# Patient Record
Sex: FEMALE | Race: OTHER | HISPANIC OR LATINO | Employment: FULL TIME | URBAN - METROPOLITAN AREA
[De-identification: names, ages, dates, MRNs, and addresses within clinical notes are randomized per-mention and may not be internally consistent; named-entity substitution may affect disease eponyms.]

---

## 2018-01-05 ENCOUNTER — HOSPITAL ENCOUNTER (EMERGENCY)
Facility: HOSPITAL | Age: 23
Discharge: HOME | End: 2018-01-05
Attending: EMERGENCY MEDICINE | Admitting: EMERGENCY MEDICINE
Payer: MEDICAID

## 2018-01-05 VITALS
OXYGEN SATURATION: 100 % | WEIGHT: 186 LBS | BODY MASS INDEX: 37.5 KG/M2 | TEMPERATURE: 98.2 F | RESPIRATION RATE: 18 BRPM | HEIGHT: 59 IN | SYSTOLIC BLOOD PRESSURE: 131 MMHG | DIASTOLIC BLOOD PRESSURE: 81 MMHG | HEART RATE: 98 BPM
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
## 2018-01-05 DIAGNOSIS — N61.0 CELLULITIS OF LEFT BREAST: Primary | ICD-10-CM

## 2018-01-05 PROCEDURE — 99282 EMERGENCY DEPT VISIT SF MDM: CPT

## 2018-01-05 RX ORDER — AMOXICILLIN AND CLAVULANATE POTASSIUM 875; 125 MG/1; MG/1
1 TABLET, FILM COATED ORAL ONCE
Status: COMPLETED | OUTPATIENT
Start: 2018-01-05 | End: 2018-01-05

## 2018-01-05 RX ORDER — AMOXICILLIN AND CLAVULANATE POTASSIUM 875; 125 MG/1; MG/1
1 TABLET, FILM COATED ORAL EVERY 12 HOURS
Qty: 19 TABLET | Refills: 0 | Status: SHIPPED | OUTPATIENT
Start: 2018-01-05 | End: 2018-01-15

## 2018-01-05 RX ADMIN — AMOXICILLIN AND CLAVULANATE POTASSIUM 1 TABLET: 875; 125 TABLET, FILM COATED ORAL at 16:26
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
## 2018-02-09 ENCOUNTER — HOSPITAL ENCOUNTER (EMERGENCY)
Facility: HOSPITAL | Age: 23
Discharge: HOME | End: 2018-02-09
Attending: EMERGENCY MEDICINE
Payer: MEDICAID

## 2018-02-09 VITALS
WEIGHT: 196 LBS | DIASTOLIC BLOOD PRESSURE: 57 MMHG | HEIGHT: 59 IN | BODY MASS INDEX: 39.51 KG/M2 | SYSTOLIC BLOOD PRESSURE: 111 MMHG | TEMPERATURE: 97.8 F | OXYGEN SATURATION: 100 % | HEART RATE: 92 BPM | RESPIRATION RATE: 18 BRPM

## 2018-02-09 DIAGNOSIS — J11.1 INFLUENZA: Primary | ICD-10-CM

## 2018-02-09 LAB
BILIRUB UR QL STRIP: NEGATIVE
CLARITY UR: CLEAR
COLOR UR: YELLOW
EXT PREG TEST URINE: NEGATIVE
GLUCOSE UR STRIP-MCNC: NEGATIVE MG/DL
HGB UR QL STRIP.AUTO: NEGATIVE
KETONES UR STRIP-MCNC: ABNORMAL MG/DL
LEUKOCYTE ESTERASE UR QL STRIP: NEGATIVE
NITRITE UR QL STRIP: NEGATIVE
PH UR STRIP.AUTO: 6.5 [PH] (ref 4.5–8)
PROT UR STRIP-MCNC: NEGATIVE MG/DL
SP GR UR STRIP.AUTO: 1.02 (ref 1–1.03)
UROBILINOGEN UR QL STRIP.AUTO: 0.2 E.U./DL

## 2018-02-09 PROCEDURE — 99284 EMERGENCY DEPT VISIT MOD MDM: CPT

## 2018-02-09 PROCEDURE — 81003 URINALYSIS AUTO W/O SCOPE: CPT

## 2018-02-09 PROCEDURE — 81025 URINE PREGNANCY TEST: CPT | Performed by: EMERGENCY MEDICINE

## 2018-02-09 RX ORDER — ONDANSETRON 4 MG/1
4 TABLET, FILM COATED ORAL EVERY 6 HOURS
Qty: 12 TABLET | Refills: 0 | Status: SHIPPED | OUTPATIENT
Start: 2018-02-09 | End: 2018-03-07

## 2018-02-09 RX ORDER — ONDANSETRON 4 MG/1
4 TABLET, ORALLY DISINTEGRATING ORAL ONCE
Status: COMPLETED | OUTPATIENT
Start: 2018-02-09 | End: 2018-02-09

## 2018-02-09 RX ORDER — MAGNESIUM HYDROXIDE/ALUMINUM HYDROXICE/SIMETHICONE 120; 1200; 1200 MG/30ML; MG/30ML; MG/30ML
30 SUSPENSION ORAL EVERY 4 HOURS PRN
Status: DISCONTINUED | OUTPATIENT
Start: 2018-02-09 | End: 2018-02-09 | Stop reason: HOSPADM

## 2018-02-09 RX ADMIN — ONDANSETRON 4 MG: 4 TABLET, ORALLY DISINTEGRATING ORAL at 14:54

## 2018-02-09 RX ADMIN — ALUMINUM HYDROXIDE, MAGNESIUM HYDROXIDE, AND SIMETHICONE 30 ML: 200; 200; 20 SUSPENSION ORAL at 15:17

## 2018-02-09 NOTE — DISCHARGE INSTRUCTIONS
Influenza, Ambulatory Care   GENERAL INFORMATION:   Influenza (the flu) is an infection caused by the influenza virus  The flu is easily spread when an infected person coughs, sneezes, or has close contact with others  You may be able to spread the flu to others for 1 week or longer after signs or symptoms appear  Common symptoms include the following:   · Fever and chills    · Headaches, body aches, and muscle or joint pain    · Cough, runny nose, and sore throat    · Loss of appetite, nausea, vomiting, or diarrhea    · Tiredness    · Trouble breathing  Seek immediate care for the following symptoms:   · Dizziness    · Urinating less or not at all    · A seizure    · A headache, stiff neck, and confusion    · Trouble breathing with blue or purple lips    · New pain or pressure in your chest  Treatment for influenza  may include any of the following:  · Acetaminophen  decreases pain and fever  It is available without a doctor's order  Ask your healthcare provider how much to take and how often to take it  Follow directions  Acetaminophen can cause liver damage if not taken correctly  · NSAIDs  help decrease swelling and pain or fever  This medicine is available with or without a doctor's order  NSAIDs can cause stomach bleeding or kidney problems in certain people  If you take blood thinner medicine, always ask your healthcare provider if NSAIDs are safe for you  Always read the medicine label and follow directions  · Antivirals  are given to fight an infection caused by a virus  Manage your symptoms:   · Get more rest and sleep  to help you get better faster when you have the flu  · Drink more liquids as directed  Ask your healthcare provider how much liquid to drink each day and which liquids are best for you  Drinking liquids helps prevent dehydration  Prevent the spread of influenza:   · Wash your hands often  Use soap and water  Use gel hand cleanser when there is no soap and water available   Do not touch your eyes, nose, or mouth unless you have washed your hands first            · Cover your mouth and nose with a tissue when you sneeze or cough  Throw the tissue in the trash right away  · Clean shared items  Clean table surfaces, doorknobs, and light switches with a germ-killing   Do not share towels, silverware, or dishes with people who are sick  Wash bed sheets, towels, silverware, and dishes with soap and water  · Wear a face mask  over your mouth and nose if you have the flu or are near anyone who has the flu  Ask where to buy single-use masks  · Stay home if you are sick  Stay away from others as much as possible while you recover  · Get an influenza vaccine  to help prevent the flu  Everyone older than age 7 months should get a yearly influenza vaccine  Get the vaccine as soon as it is available, usually in October or November each year  Follow up with your healthcare provider as directed:  Write down your questions so you remember to ask them during your visits  CARE AGREEMENT:   You have the right to help plan your care  Learn about your health condition and how it may be treated  Discuss treatment options with your caregivers to decide what care you want to receive  You always have the right to refuse treatment  The above information is an  only  It is not intended as medical advice for individual conditions or treatments  Talk to your doctor, nurse or pharmacist before following any medical regimen to see if it is safe and effective for you  © 2014 [address] is for End User's use only and may not be sold, redistributed or otherwise used for commercial purposes  All illustrations and images included in CareNotes® are the copyrighted property of [employer]  or Manoj Johnson

## 2018-02-09 NOTE — ED PROVIDER NOTES
History  Chief Complaint   Patient presents with    Abdominal Pain     Pt c/o URI symptoms,vomiting, abdominal pain and left ear pain for approx  3 days     This is a 71-year-old female with a past medical history of asthma who presents to the emergency room this afternoon with three days of subjective fevers, cough, nausea/vomiting, and epigastric pain  Patient states that she has been tolerating her symptoms until today and decided to come into the ER  Patient has been taking ibuprofen and Tylenol for the symptoms with little relief  Patient states that her abdominal pain is in epigastric region and radiates to her back  Patient states that she has been vomiting multiple times per day without any hematemesis  Patient last vomited this morning, three times prior to coming here  Further review of systems is as below  None       Past Medical History:   Diagnosis Date    Asthma        History reviewed  No pertinent surgical history  History reviewed  No pertinent family history  I have reviewed and agree with the history as documented  Social History   Substance Use Topics    Smoking status: Never Smoker    Smokeless tobacco: Never Used    Alcohol use No        Review of Systems   Constitutional: Positive for fever (Subjective)  Negative for chills and fatigue  HENT: Negative for congestion, rhinorrhea, sinus pressure and sore throat  Eyes: Negative for visual disturbance  Respiratory: Positive for cough  Negative for shortness of breath and wheezing  Cardiovascular: Negative for chest pain and leg swelling  Gastrointestinal: Positive for abdominal pain ( epigastric)  Negative for constipation, diarrhea, nausea and vomiting  Genitourinary: Negative for dysuria, frequency, hematuria and urgency  Musculoskeletal: Negative for arthralgias and myalgias  Skin: Negative for color change and rash  Neurological: Negative for dizziness, light-headedness and numbness         Physical Exam  ED Triage Vitals   Temperature Pulse Respirations Blood Pressure SpO2   02/09/18 1022 02/09/18 1022 02/09/18 1022 02/09/18 1022 02/09/18 1022   97 8 °F (36 6 °C) 80 18 123/84 100 %      Temp Source Heart Rate Source Patient Position - Orthostatic VS BP Location FiO2 (%)   02/09/18 1022 02/09/18 1113 02/09/18 1113 02/09/18 1113 --   Oral Monitor Lying Right arm       Pain Score       02/09/18 1022       8           Orthostatic Vital Signs  Vitals:    02/09/18 1203 02/09/18 1308 02/09/18 1416 02/09/18 1503   BP: 117/51 104/54 111/57 111/57   Pulse: 87 86 86 92   Patient Position - Orthostatic VS: Lying Lying Lying Lying       Physical Exam   Constitutional: She is oriented to person, place, and time  She appears well-developed and well-nourished  No distress  HENT:   Head: Normocephalic and atraumatic  Eyes: Conjunctivae are normal  Pupils are equal, round, and reactive to light  Cardiovascular: Normal rate, regular rhythm and normal heart sounds  Exam reveals no gallop and no friction rub  No murmur heard  Pulmonary/Chest: Effort normal and breath sounds normal  No respiratory distress  She has no wheezes  She has no rales  Abdominal: Soft  Bowel sounds are normal  She exhibits no distension  There is tenderness (Epigastric radiating to back)  There is no guarding  Musculoskeletal: Normal range of motion  She exhibits no edema, tenderness or deformity  Neurological: She is alert and oriented to person, place, and time  Skin: Skin is warm and dry  Capillary refill takes less than 2 seconds  She is not diaphoretic  Psychiatric: She has a normal mood and affect  Her behavior is normal    Nursing note and vitals reviewed        ED Medications  Medications   aluminum-magnesium hydroxide-simethicone (MYLANTA) 200-200-20 mg/5 mL oral suspension 30 mL (30 mL Oral Given 2/9/18 1517)   ondansetron (ZOFRAN-ODT) dispersible tablet 4 mg (4 mg Oral Given 2/9/18 2884)       Diagnostic Studies  Results Reviewed     Procedure Component Value Units Date/Time    POCT pregnancy, urine [[id number]]  (Normal) Resulted:  02/09/18 1118    Lab Status:  Final result Updated:  02/09/18 1219     EXT PREG TEST UR (Ref: Negative) NEGATIVE    ED Urine Macroscopic [[id number]]  (Abnormal) Collected:  02/09/18 1122    Lab Status:  Final result Specimen:  Urine Updated:  02/09/18 1117     Color, UA Yellow     Clarity, UA Clear     pH, UA 6 5     Leukocytes, UA Negative     Nitrite, UA Negative     Protein, UA Negative mg/dl      Glucose, UA Negative mg/dl      Ketones, UA Trace (A) mg/dl      Urobilinogen, UA 0 2 E U /dl      Bilirubin, UA Negative     Blood, UA Negative     Specific Gravity, UA 1 025    Narrative:       CLINITEK RESULT                 No orders to display         Procedures  Procedures      Phone Consults  ED Phone Contact    ED Course  ED Course                                MDM  Number of Diagnoses or Management Options  Influenza:   Diagnosis management comments: Patient likely suffering from influenza since symptoms have been going on for 72 hours there is no place for Tamiflu at this time  Patient was given Zofran and a GI cocktail here in the emergency room with improvement in her symptoms  She was discharged home in good condition with prescription for Zofran and instructions to return to the ER should symptoms worsen  CritCare Time    Disposition  Final diagnoses:   Influenza     Time reflects when diagnosis was documented in both MDM as applicable and the Disposition within this note     Time User Action Codes Description Comment    2/9/2018  3:22 PM Dinora Mcallister Add [J11 1] Influenza       ED Disposition     ED Disposition Condition Comment    Discharge  [address] discharge to home/self care      Condition at discharge: Good        Follow-up Information     Follow up With Specialties Details Why Contact Info    [employer]    [phone]          Discharge Medication List as of 2/9/2018  3:24 PM START taking these medications    Details   ondansetron (ZOFRAN) 4 mg tablet Take 1 tablet (4 mg total) by mouth every 6 (six) hours, Starting Fri 2/9/2018, Print           No discharge procedures on file  ED Provider  Attending physically available and evaluated [facility]  I managed the patient along with the ED Attending      Electronically Signed by         Nannette Romberg, MD  02/09/18 8526

## 2018-02-09 NOTE — ED ATTENDING ATTESTATION
Salas Warren MD, saw and evaluated the patient  I have discussed the patient with the resident/non-physician practitioner and agree with the resident's/non-physician practitioner's findings, Plan of Care, and MDM as documented in the resident's/non-physician practitioner's note, except where noted  All available labs and Radiology studies were reviewed  At this point I agree with the current assessment done in the Emergency Department  I have conducted an independent evaluation of this patient including a focused history and a physical exam     80-year-old female, previously healthy, presenting to the emergency department for evaluation of a several day history of cough, low-grade intermittent fevers, and 1 day of vomiting  Patient was sent home from work for the same  Patient reports some epigastric abdominal discomfort that she describes as a burning type sensation  No diarrhea  No chest pain  Cough is nonproductive  Ten systems reviewed negative except as noted in the history of present illness  The patient is resting comfortably on a stretcher in no acute respiratory distress  The patient appears nontoxic  HEENT reveals moist mucous membranes  Head is normocephalic and atraumatic  Conjunctiva and sclera are normal  Neck is nontender and supple with full range of motion to flexion, extension, lateral rotation  No meningismus appreciated  No masses are appreciated  Lungs are clear to auscultation bilaterally without any wheezes, rales or rhonchi  Heart is regular rate and rhythm without any murmurs, rubs or gallops  Abdomen is soft and nontender without any rebound or guarding  Extremities appear grossly normal without any significant arthropathy  Patient is awake, alert, and oriented x3  The patient has normal interaction  Motor is 5 out of 5  Assessment and plan:  80-year-old female presenting to the emergency department for evaluation several days of cough, fever, 1 day of vomiting  Patient appears clinically well  Likely influenza  Plan is Zofran and GI cocktail

## 2018-03-07 ENCOUNTER — HOSPITAL ENCOUNTER (EMERGENCY)
Facility: HOSPITAL | Age: 23
Discharge: HOME | End: 2018-03-07
Attending: EMERGENCY MEDICINE | Admitting: EMERGENCY MEDICINE
Payer: MEDICAID

## 2018-03-07 VITALS
SYSTOLIC BLOOD PRESSURE: 126 MMHG | OXYGEN SATURATION: 100 % | WEIGHT: 188 LBS | BODY MASS INDEX: 37.97 KG/M2 | TEMPERATURE: 99.8 F | HEART RATE: 99 BPM | RESPIRATION RATE: 18 BRPM | DIASTOLIC BLOOD PRESSURE: 58 MMHG

## 2018-03-07 DIAGNOSIS — Z98.890 S/P BILATERAL BREAST REDUCTION: ICD-10-CM

## 2018-03-07 DIAGNOSIS — N64.59 BLEEDING FROM BREAST: Primary | ICD-10-CM

## 2018-03-07 PROCEDURE — 99283 EMERGENCY DEPT VISIT LOW MDM: CPT

## 2018-03-07 NOTE — ED ATTENDING ATTESTATION
Emeka Castorena MD, saw and evaluated the patient  All available labs and X-rays were ordered by me or the resident and have been reviewed by myself  I discussed the patient with the resident / non-physician and agree with the resident's / non-physician practitioner's findings and plan as documented in the resident's / non-physician practicitioner's note, except where noted  At this point, I agree with the current assessment done in the ED  Chief Complaint   Patient presents with    Post-op Problem     pt had breast reduction Monday and spotting was noted on bandages today  This is a 77-year-old female presenting for evaluation of postoperative bleeding  The patient states that she had a breast reduction surgery done in [place] on Monday  Today she noted a very small amount of bleeding from her right lower breast incision  She was concerned about the came in for evaluation  Denies fevers chills nausea vomiting chest pain shortness of breath  Denies dizziness or lightheadedness  She did not call the surgeon but instead came here  Denies any blood thinners  PMH:  - None  PSH:  - surgical reduction  Denies smoking, drinking, drugs  PE:  Vitals:    03/07/18 1625 03/07/18 1655   BP: 126/58    BP Location: Left arm    Pulse: (!) 116 99   Resp: 20 18   Temp: 99 8 °F (37 7 °C)    TempSrc: Oral    SpO2: 99% 100%   Weight: 85 3 kg (188 lb)    General: VS reviewed  Appears in NAD  awake, alert  Well-nourished, well-developed  Appears stated age  Speaking normally in full sentences  Head: Normocephalic, atraumatic, nontender  Eyes: EOM-I  No diplopia  No hyphema  No subconjunctival hemorrhages  Symmetrical lids  ENT: Atraumatic external nose and ears  MMM  No malocclusion  No stridor  Normal phonation  No drooling  Normal swallowing  Neck: No JVD  CV: No pallor noted  Peripheral pulses +2 throughout  No chest wall tenderness     Lungs:   No tachypnea  No respiratory distress  MSK: FROM   Skin: Dry  Has in for memory horizontally oriented current linear incision  It is clean, dry  There is a very small amount of blood coming from a single Steri-Strip area  It does not appear like significant bleeding  The blood that she was concerned about is currently on the ABD pad that is anterior to the wound and is just a very small amount  I do not think that it needs any new dressing or surgical reapproximation  Neuro: Awake, alert, GCS15, CN II-XII grossly intact  Motor grossly intact  Psychiatric/Behavioral: Appropriate mood and affect   Exam: deferred  A/P:  - wound evaluated, it is a small amount of blood, I am not concerned at this point and explained the amount of blood that 1 her to come back for  We will have close follow-up with her surgeon  Will discharge  - exam done with nurse in the room (damian)  - 13 point ROS was performed and all are normal unless stated in the history above  - Nursing note reviewed  Vitals reviewed  - Orders placed by myself and/or advanced practitioner / resident     - Previous chart was not reviewed  - No language barrier    - History obtained from patient  - There are no limitations to the history obtained  - Critical care time: Not applicable for this patient  Final Diagnosis:  1  Bleeding from breast    2  S/P bilateral breast reduction        ED Course      Medications - No data to display  No orders to display     No orders of the defined types were placed in this encounter      Labs Reviewed - No data to display  Time reflects when diagnosis was documented in both MDM as applicable and the Disposition within this note     Time User Action Codes Description Comment    3/7/2018  4:51 PM Yelitza Daly Add [Z18 538] S/P bilateral breast reduction     3/7/2018  4:51 PM Yelitza Daly Add [N64 59] Bleeding from breast     3/7/2018  4:51 PM Yelitza Daly Modify [L79 801] S/P bilateral breast reduction     3/7/2018  4:51 PM Yobani Door Modify [N64 59] Bleeding from breast       ED Disposition     ED Disposition Condition Comment    Discharge  [facility] discharge to home/self care  Condition at discharge: Good        Follow-up Information     Follow up With Specialties Details Why Contact Info    Your plastic surgeon  Call As needed, If symptoms worsen, For wound re-check         Patient's Medications   Discharge Prescriptions    No medications on file     No discharge procedures on file  None       Portions of the record may have been created with voice recognition software  Occasional wrong word or "sound a like" substitutions may have occurred due to the inherent limitations of voice recognition software  Read the chart carefully and recognize, using context, where substitutions have occurred      Electronically signed by:  Christian Messina

## 2018-03-07 NOTE — ED PROVIDER NOTES
History  Chief Complaint   Patient presents with    Post-op Problem     pt had breast reduction Monday and spotting was noted on bandages today  This is a 25 y o  old female who presents to the ED for evaluation of postop evaluation  Patient underwent breast reduction surgery by a surgeon in [facility] 2 days ago  Today she presents with some postoperative bleeding and wanted to be evaluated  She did not speak with her surgeon, but did email him and he did not get back to her yet, so she came to the ED  Patient noted small amount of bleeding on the right side underneath the breast   She noted a dot of blood on the breast binder  She is not trans to dressing since the surgery  He reports no other concerns for bleeding  No fever or chills  No chest pain, shortness of breath lightheadedness, dizziness, other concerning symptoms  None     Past Medical History:   Diagnosis Date    Asthma      History reviewed  No pertinent surgical history  History reviewed  No pertinent family history  I have reviewed and agree with the history as documented  Social History   Substance Use Topics    Smoking status: Never Smoker    Smokeless tobacco: Never Used    Alcohol use No     Review of Systems   Constitutional: Negative for chills, fatigue, fever and unexpected weight change  HENT: Negative for congestion, rhinorrhea and sore throat  Eyes: Negative for redness and visual disturbance  Respiratory: Negative for cough and shortness of breath  Cardiovascular: Negative for chest pain and leg swelling  Gastrointestinal: Negative for abdominal pain, constipation, diarrhea, nausea and vomiting  Endocrine: Negative for cold intolerance and heat intolerance  Genitourinary: Negative for dysuria, frequency and urgency  Musculoskeletal: Negative for back pain  Skin: Negative for rash  Neurological: Negative for dizziness, syncope and numbness     All other systems reviewed and are negative  Physical Exam  ED Triage Vitals [03/07/18 1625]   Temperature Pulse Respirations Blood Pressure SpO2   99 8 °F (37 7 °C) (!) 116 20 126/58 99 %      Temp Source Heart Rate Source Patient Position - Orthostatic VS BP Location FiO2 (%)   Oral Monitor Sitting Left arm --      Pain Score       No Pain         Physical Exam   Constitutional: She is oriented to person, place, and time  She appears well-developed and well-nourished  No distress  HENT:   Head: Normocephalic and atraumatic  Nose: Nose normal    Mouth/Throat: No oropharyngeal exudate  Eyes: Conjunctivae and EOM are normal  Pupils are equal, round, and reactive to light  Neck: Normal range of motion  Neck supple  Cardiovascular: Normal rate, regular rhythm and normal heart sounds  Exam reveals no gallop  No murmur heard  Pulmonary/Chest: Effort normal and breath sounds normal  She has no wheezes  She exhibits no tenderness  Surgical dressings applied to bilateral breasts there is a binder in place  Small amount of dried blood on the binder on the right side  When taken down there is a small area of dried blood underneath the right breast   No active bleeding  No fluctuance, purulent discharge, erythema, ecchymosis  Abdominal: Soft  Bowel sounds are normal  She exhibits no distension  There is no tenderness  There is no rebound and no guarding  Musculoskeletal: Normal range of motion  She exhibits no tenderness or deformity  Lymphadenopathy:     She has no cervical adenopathy  Neurological: She is alert and oriented to person, place, and time  No cranial nerve deficit  Skin: Skin is warm and dry  No rash noted  She is not diaphoretic  No erythema  Psychiatric: She has a normal mood and affect  Nursing note and vitals reviewed      ED Medications  Medications - No data to display    Diagnostic Studies  Results Reviewed     None        No orders to display     Procedures  Procedures    Phone Consults  ED Phone Contact    ED Course    A/P: This is a 25 y o  female who presents to the ED for evaluation of bleeding  There appears to be a normal amount of postoperative blood on exam   Patient has normal vitals, was ambulating without difficulty, tolerating p o  Recommend conservative management and outpatient follow-up with her surgeon  She was counseled on warning signs for severe bleeding, anemia, infection  I personally discussed return precautions with this patient  I provided the patient with written discharge instructions and particularly highlighted specific areas of interest to this patient, including but not limited to: medications for symptom managment, follow up recommendations, and return precautions  Patient is in agreement with this plan as outlined above  MDM  CritCare Time    Disposition  Final diagnoses:   S/P bilateral breast reduction   Bleeding from breast     Time reflects when diagnosis was documented in both MDM as applicable and the Disposition within this note     Time User Action Codes Description Comment    3/7/2018  4:51 PM Asuncion Yepez Add [B24 [id number]] S/P bilateral breast reduction     3/7/2018  4:51 PM Asuncion Yepez Add [N64 59] Bleeding from breast     3/7/2018  4:51 PM Asuncion Yepez Modify [X89 [id number]] S/P bilateral breast reduction     3/7/2018  4:51 PM Asuncion Yepez Modify [N64 59] Bleeding from breast       ED Disposition     ED Disposition Condition Comment    Discharge  [address] discharge to home/self care      Condition at discharge: Good        Follow-up Information     Follow up With Specialties Details Why Contact Info    Your plastic surgeon  Call As needed, If symptoms worsen, For wound re-check         Discharge Medication List as of 3/7/2018  4:53 PM      CONTINUE these medications which have NOT CHANGED    Details   ondansetron (ZOFRAN) 4 mg tablet Take 1 tablet (4 mg total) by mouth every 6 (six) hours, Starting Fri 2/9/2018, Print No discharge procedures on file  ED Provider  Attending physically available and evaluated [facility]  I managed the patient along with the ED Attending      Electronically Signed by         Gil Briggs MD  03/07/18 1015

## 2018-03-07 NOTE — DISCHARGE INSTRUCTIONS
Postoperative Bleeding   WHAT YOU NEED TO KNOW:   Postoperative bleeding is bleeding after surgery  The incision may bleed, but bleeding can also occur inside the body  The bleeding may start immediately, or several days after surgery  Postoperative bleeding can become life-threatening  DISCHARGE INSTRUCTIONS:   Follow up with your healthcare provider as directed:  Write down your questions so you remember to ask them during your visits  Contact your healthcare provider if:   · You feel anxious or confused  · You have questions or concerns about your condition or care  Return to the emergency department if:   · Blood soaks through the bandage covering your incision  · Your heart is beating faster than normal for you  · You are breathing faster than normal for you, or you feel short of breath  · You are urinating less than usual, or not at all  · Your skin feels cool and clammy  © 2017 [address] Information is for End User's use only and may not be sold, redistributed or otherwise used for commercial purposes  All illustrations and images included in CareNotes® are the copyrighted property of A D [employer] M [employer]  or [address]  The above information is an  only  It is not intended as medical advice for individual conditions or treatments  Talk to your doctor, nurse or pharmacist before following any medical regimen to see if it is safe and effective for you

## 2018-07-03 ENCOUNTER — HOSPITAL ENCOUNTER (EMERGENCY)
Facility: HOSPITAL | Age: 23
Discharge: HOME | End: 2018-07-03
Attending: EMERGENCY MEDICINE
Payer: MEDICAID

## 2018-07-03 VITALS
BODY MASS INDEX: 40.4 KG/M2 | SYSTOLIC BLOOD PRESSURE: 115 MMHG | RESPIRATION RATE: 18 BRPM | WEIGHT: 200 LBS | DIASTOLIC BLOOD PRESSURE: 54 MMHG | HEART RATE: 104 BPM | TEMPERATURE: 99.7 F | OXYGEN SATURATION: 98 %

## 2018-07-03 DIAGNOSIS — N39.0 UTI (URINARY TRACT INFECTION): Primary | ICD-10-CM

## 2018-07-03 LAB
BACTERIA UR QL AUTO: ABNORMAL /HPF
BILIRUB UR QL STRIP: NEGATIVE
CLARITY UR: ABNORMAL
CLARITY, POC: NORMAL
COLOR UR: YELLOW
COLOR, POC: YELLOW
EXT PREG TEST URINE: NEGATIVE
GLUCOSE UR STRIP-MCNC: NEGATIVE MG/DL
HGB UR QL STRIP.AUTO: ABNORMAL
HYALINE CASTS #/AREA URNS LPF: ABNORMAL /LPF
KETONES UR STRIP-MCNC: ABNORMAL MG/DL
LEUKOCYTE ESTERASE UR QL STRIP: ABNORMAL
NITRITE UR QL STRIP: POSITIVE
NON-SQ EPI CELLS URNS QL MICRO: ABNORMAL /HPF
PH UR STRIP.AUTO: 6 [PH] (ref 4.5–8)
PROT UR STRIP-MCNC: ABNORMAL MG/DL
RBC #/AREA URNS AUTO: ABNORMAL /HPF
SP GR UR STRIP.AUTO: >=1.03 (ref 1–1.03)
UROBILINOGEN UR QL STRIP.AUTO: 2 E.U./DL
WBC #/AREA URNS AUTO: ABNORMAL /HPF

## 2018-07-03 PROCEDURE — 96372 THER/PROPH/DIAG INJ SC/IM: CPT

## 2018-07-03 PROCEDURE — 87077 CULTURE AEROBIC IDENTIFY: CPT

## 2018-07-03 PROCEDURE — 81001 URINALYSIS AUTO W/SCOPE: CPT

## 2018-07-03 PROCEDURE — 87086 URINE CULTURE/COLONY COUNT: CPT

## 2018-07-03 PROCEDURE — 87186 SC STD MICRODIL/AGAR DIL: CPT

## 2018-07-03 PROCEDURE — 99283 EMERGENCY DEPT VISIT LOW MDM: CPT

## 2018-07-03 PROCEDURE — 81025 URINE PREGNANCY TEST: CPT | Performed by: EMERGENCY MEDICINE

## 2018-07-03 RX ORDER — NITROFURANTOIN 25; 75 MG/1; MG/1
100 CAPSULE ORAL ONCE
Status: COMPLETED | OUTPATIENT
Start: 2018-07-03 | End: 2018-07-03

## 2018-07-03 RX ORDER — NITROFURANTOIN 25; 75 MG/1; MG/1
100 CAPSULE ORAL 2 TIMES DAILY
Qty: 14 CAPSULE | Refills: 0 | Status: SHIPPED | OUTPATIENT
Start: 2018-07-03 | End: 2018-07-10

## 2018-07-03 RX ORDER — ONDANSETRON 4 MG/1
4 TABLET, ORALLY DISINTEGRATING ORAL ONCE
Status: COMPLETED | OUTPATIENT
Start: 2018-07-03 | End: 2018-07-03

## 2018-07-03 RX ORDER — KETOROLAC TROMETHAMINE 30 MG/ML
15 INJECTION, SOLUTION INTRAMUSCULAR; INTRAVENOUS ONCE
Status: COMPLETED | OUTPATIENT
Start: 2018-07-03 | End: 2018-07-03

## 2018-07-03 RX ADMIN — NITROFURANTOIN (MONOHYDRATE/MACROCRYSTALS) 100 MG: 75; 25 CAPSULE ORAL at 21:13

## 2018-07-03 RX ADMIN — ONDANSETRON 4 MG: 4 TABLET, ORALLY DISINTEGRATING ORAL at 21:14

## 2018-07-03 RX ADMIN — KETOROLAC TROMETHAMINE 15 MG: 30 INJECTION, SOLUTION INTRAMUSCULAR at 21:13

## 2018-07-04 NOTE — ED ATTENDING ATTESTATION
Manda Graff MD, saw and evaluated the patient  All available labs and X-rays were ordered by me or the resident and have been reviewed by myself  I discussed the patient with the resident / non-physician and agree with the resident's / non-physician practitioner's findings and plan as documented in the resident's / non-physician practicitioner's note, except where noted  At this point, I agree with the current assessment done in the ED  Chief Complaint   Patient presents with    Urinary Frequency     Pt complains of urinary frequency with pain and bilateral flank pain starting today while at work       this is a 63-year-old female presenting for evaluation of possible urinary tract infection  She states that throughout the day today she has been noticing increased urinary frequency, burning sensation when she does urinate  She denies any fevers chills  She does have a little bit of nausea without any vomiting  No changes in oral intake today despite the nausea  She has some low back pain which is similar to previous back pain  No vaginal discharge vaginal bleeding  Last menstrual cycle was 1 month ago  She is sexually active with 1 partner, uses condoms, no history of STDs in the past   She has had no urinary tract infections before  She denies any recent travel  Denies any past medical history  PE:  Vitals:    07/03/18 1941   BP: 115/54   Pulse: 104   Resp: 18   Temp: 99 7 °F (37 6 °C)   TempSrc: Tympanic   SpO2: 98%   Weight: 90 7 kg (200 lb)   General: VSS, NAD, awake, alert  Well-nourished, well-developed  Appears stated age  Speaking normally in full sentences  Obese  Head: Normocephalic, atraumatic, nontender  Eyes: PERRL, EOM-I  No diplopia  No hyphema  No subconjunctival hemorrhages  Symmetrical lids  ENT: Atraumatic external nose and ears  MMM  No malocclusion  No stridor  Normal phonation  No drooling  Normal swallowing  Neck: Symmetric, trachea midline   No JVD   CV: RRR  +S1/S2  No murmurs or gallops  Peripheral pulses +2 throughout  No chest wall tenderness  Lungs:   Unlabored No retractions  CTAB, lungs sounds equal bilateral    No tachypnea  Abd: +BS, soft, suprapubpic tenderness present  ND   MSK:   FROM   No CVAT bilaterally  Back:   No rashes  Skin: Dry, intact  Neuro: AAOx3, GCS 15, CN II-XII grossly intact  Motor grossly intact  Psychiatric/Behavioral: Appropriate mood and affect   Exam: deferred  A:  - UTI  P:  -   Clinically sounds like a urinary tract infection, will treat as such  She has no story or exam suggesting pyelonephritis  I do not think a vaginal examination is necessary at this juncture  I discussed follow-up with primary care  I discussed that we will start [address] and if the culture shows something resistant we will call her  Patient is okay with this plan and has no questions at this time  NSAIDs for pain  - 13 point ROS was performed and all are normal unless stated in the history above  - Nursing note reviewed  Vitals reviewed  - Orders placed by myself and/or advanced practitioner / resident     - Previous chart was not reviewed  - No language barrier    - History obtained from patient  - There are no limitations to the history obtained  - Critical care time: Not applicable for this patient       Final Diagnosis:  1  UTI (urinary tract infection)      ED Course as of Jul 03 2104   Tue Jul 03, 2018 2054 Leukocytes, UA: (!) Large   2054 Nitrite, UA: (!) Positive   2054 Specific Gravity, UA: >=1 030     Medications   ketorolac (TORADOL) injection 15 mg (not administered)   ondansetron (ZOFRAN-ODT) dispersible tablet 4 mg (not administered)   nitrofurantoin (MACROBID) extended-release capsule 100 mg (not administered)     No orders to display     Orders Placed This Encounter   Procedures    Urine Microscopic    POCT pregnancy, urine    POCT urinalysis dipstick     Labs Reviewed   ED URINE MACROSCOPIC - Abnormal Result Value Ref Range Status    Color, UA Yellow   Final    Clarity, UA Cloudy   Final    pH, UA 6 0  4 5 - 8 0 Final    Leukocytes, UA Large (*) Negative Final    Nitrite, UA Positive (*) Negative Final    Protein,  (2+) (*) Negative mg/dl Final    Glucose, UA Negative  Negative mg/dl Final    Ketones, UA 15 (1+) (*) Negative mg/dl Final    Urobilinogen, UA 2 0 (*) 0 2, 1 0 E U /dl E U /dl Final    Bilirubin, UA Negative  Negative Final    Blood, UA Large (*) Negative Final    Specific Gravity, UA >=1 030  1 003 - 1 030 Final    Narrative:     CLINITEK RESULT   POCT PREGNANCY, URINE - Normal    EXT PREG TEST UR (Ref: Negative) Negative   Final   POCT URINALYSIS DIPSTICK - Normal    Color, UA Yellow   Final    Clarity, UA Cloudy   Final   URINE MICROSCOPIC     Time reflects when diagnosis was documented in both MDM as applicable and the Disposition within this note     Time User Action Codes Description Comment    7/3/2018  9:00 PM Marcelo Butter Add [N39 0] UTI (urinary tract infection)       ED Disposition     ED Disposition Condition Comment    Discharge  [address] [facility] discharge to home/self care  Condition at discharge: Good        Follow-up Information     Follow up With Specialties Details Why Contact Info Additional Information    [employer]folink  Call  [phone]       [facility] Emergency Medicine Go to If symptoms worsen [address]  [phone]  ED, [address] [place] [address], [phone]        Patient's Medications   Discharge Prescriptions    NITROFURANTOIN (MACROBID) 100 MG CAPSULE    Take 1 capsule (100 mg total) by mouth 2 (two) times a day for 7 days       Start Date: 7/3/2018  End Date: 7/10/2018       Order Dose: 100 mg       Quantity: 14 capsule    Refills: 0     No discharge procedures on file  None       Portions of the record may have been created with voice recognition software   Occasional wrong word or "sound a like" substitutions may have occurred due to the inherent limitations of voice recognition software  Read the chart carefully and recognize, using context, where substitutions have occurred      Electronically signed by:  Madai Bertrand

## 2018-07-04 NOTE — DISCHARGE INSTRUCTIONS

## 2018-07-04 NOTE — ED PROVIDER NOTES
History  Chief Complaint   Patient presents with    Urinary Frequency     Pt complains of urinary frequency with pain and bilateral flank pain starting today while at work      19-year-old female with complaint of burning with urination which started today while she was at work  Patient has previously never had a urinary tract infection in the past and states she believes that is what she has right now  She also endorses some mild suprapubic abdominal pain at rest worsening significantly to burning with urination she does not have any vaginal discharge or bleeding last menstrual period was approximately 1 month ago  Patient is sexually active with 1 partner and uses condoms every time  Patient denies history of kidney stones or sexually transmitted infections in her or her partner  She endorses some mild nausea but no episodes of vomiting she denies any CVA tenderness, chest pains or shortness of breath  She has no other complaints at this time and, remaining ROS negative  History provided by:  Patient   used: No    Urinary Frequency   Severity:  Moderate  Onset quality:  Sudden  Timing:  Constant  Associated symptoms: abdominal pain and nausea    Associated symptoms: no chest pain, no diarrhea, no fever, no myalgias, no shortness of breath, no sore throat, no vomiting and no wheezing        None       Past Medical History:   Diagnosis Date    Asthma        No past surgical history on file  No family history on file  I have reviewed and agree with the history as documented  Social History   Substance Use Topics    Smoking status: Never Smoker    Smokeless tobacco: Never Used    Alcohol use No        Review of Systems   Constitutional: Negative for chills and fever  HENT: Negative for sore throat  Eyes: Negative for visual disturbance  Respiratory: Negative for chest tightness, shortness of breath and wheezing  Cardiovascular: Negative for chest pain  Gastrointestinal: Positive for abdominal pain and nausea  Negative for blood in stool, constipation, diarrhea and vomiting  Genitourinary: Positive for frequency  Negative for dysuria and hematuria  Musculoskeletal: Negative for arthralgias and myalgias  Skin: Negative for color change  Neurological: Negative for light-headedness  Hematological: Negative for adenopathy  Psychiatric/Behavioral: Negative for agitation and behavioral problems  All other systems reviewed and are negative  Physical Exam  ED Triage Vitals [07/03/18 1941]   Temperature Pulse Respirations Blood Pressure SpO2   99 7 °F (37 6 °C) 104 18 115/54 98 %      Temp Source Heart Rate Source Patient Position - Orthostatic VS BP Location FiO2 (%)   Tympanic -- -- -- --      Pain Score       Worst Possible Pain           Orthostatic Vital Signs  Vitals:    07/03/18 1941   BP: 115/54   Pulse: 104       Physical Exam   Constitutional: She is oriented to person, place, and time  She appears well-developed and well-nourished  No distress  HENT:   Head: Normocephalic and atraumatic  Eyes: Conjunctivae and EOM are normal  No scleral icterus  Neck: Normal range of motion  Neck supple  Cardiovascular: Normal rate, regular rhythm and normal heart sounds  No murmur heard  Pulmonary/Chest: Effort normal and breath sounds normal  No respiratory distress  Abdominal: Soft  Bowel sounds are normal  There is tenderness  Musculoskeletal: Normal range of motion  Neurological: She is alert and oriented to person, place, and time  Skin: Skin is warm and dry  Psychiatric: She has a normal mood and affect  Her behavior is normal    Nursing note and vitals reviewed        ED Medications  Medications   ketorolac (TORADOL) injection 15 mg (15 mg Intramuscular Given 7/3/18 2113)   ondansetron (ZOFRAN-ODT) dispersible tablet 4 mg (4 mg Oral Given 7/3/18 2114)   nitrofurantoin (MACROBID) extended-release capsule 100 mg (100 mg Oral Given 7/3/18 2113)       Diagnostic Studies  Results Reviewed     Procedure Component Value Units Date/Time    Urine Microscopic [[id number]]  (Abnormal) Collected:  07/03/18 2050    Lab Status:  Final result Specimen:  Urine from Urine, Clean Catch Updated:  07/03/18 2110     RBC, UA Innumerable (A) /hpf      WBC, UA Innumerable (A) /hpf      Epithelial Cells None Seen /hpf      Bacteria, UA Moderate (A) /hpf      Hyaline Casts, UA None Seen /lpf     Urine culture [[id number]] Collected:  07/03/18 2050    Lab Status:   In process Specimen:  Urine from Urine, Clean Catch Updated:  07/03/18 2110    POCT urinalysis dipstick [[id number]]  (Normal) Resulted:  07/03/18 2041    Lab Status:  Final result Specimen:  Urine Updated:  07/03/18 2043     Color, UA Yellow     Clarity, UA Cloudy    POCT pregnancy, urine [[id number]]  (Normal) Resulted:  07/03/18 2041    Lab Status:  Final result Updated:  07/03/18 2043     EXT PREG TEST UR (Ref: Negative) Negative    ED Urine Macroscopic [[id number]]  (Abnormal) Collected:  07/03/18 2050    Lab Status:  Final result Specimen:  Urine Updated:  07/03/18 2041     Color, UA Yellow     Clarity, UA Cloudy     pH, UA 6 0     Leukocytes, UA Large (A)     Nitrite, UA Positive (A)     Protein,  (2+) (A) mg/dl      Glucose, UA Negative mg/dl      Ketones, UA 15 (1+) (A) mg/dl      Urobilinogen, UA 2 0 (A) E U /dl      Bilirubin, UA Negative     Blood, UA Large (A)     Specific Gravity, UA >=1 030    Narrative:       CLINITEK RESULT                 No orders to display         Procedures  Procedures      Phone Consults  ED Phone Contact    ED Course  ED Course as of Jul 03 2316 Tue Jul 03, 2018 2101 Leukocytes, UA: (!) Large   2101 Nitrite, UA: (!) Positive   2101 Blood, UA: (!) Large                               MDM  Number of Diagnoses or Management Options  UTI (urinary tract infection): new and requires workup  Diagnosis management comments: 27-year-old female with complaints of urinary frequency and burning on urination, will obtain urinalysis to evaluate  Patient had significant urinary tract infection, will prescribe Macrobid giving 1st dosed while in the emergency department and follow up with PCP  Amount and/or Complexity of Data Reviewed  Clinical lab tests: ordered and reviewed  Tests in the medicine section of CPT®: ordered and reviewed  Review and summarize past medical records: yes      CritCare Time    Disposition  Final diagnoses:   UTI (urinary tract infection)     Time reflects when diagnosis was documented in both MDM as applicable and the Disposition within this note     Time User Action Codes Description Comment    7/3/2018  9:00 PM Rosette Ny Add [N39 0] UTI (urinary tract infection)       ED Disposition     ED Disposition Condition Comment    Discharge  [facility] discharge to home/self care  Condition at discharge: Good        Follow-up Information     Follow up With Specialties Details Why Contact Info Additional Information    [employer]  Call  [phone]       [address] [place] [address] Emergency Department Emergency Medicine Go to If symptoms worsen [address]  [phone]  ED, [address] [place] [address], [id number]          Discharge Medication List as of 7/3/2018  9:04 PM      START taking these medications    Details   nitrofurantoin (MACROBID) 100 mg capsule Take 1 capsule (100 mg total) by mouth 2 (two) times a day for 7 days, Starting Tue 7/3/2018, Until Tue 7/10/2018, Print           No discharge procedures on file  ED Provider  Attending physically available and evaluated [facility]  I managed the patient along with the ED Attending      Electronically Signed by         Kiran Polo MD  07/03/18 5190

## 2018-07-05 LAB — BACTERIA UR CULT: ABNORMAL
